# Patient Record
Sex: MALE | Race: WHITE | Employment: UNEMPLOYED | URBAN - METROPOLITAN AREA
[De-identification: names, ages, dates, MRNs, and addresses within clinical notes are randomized per-mention and may not be internally consistent; named-entity substitution may affect disease eponyms.]

---

## 2019-02-06 ENCOUNTER — OFFICE VISIT (OUTPATIENT)
Dept: FAMILY MEDICINE CLINIC | Age: 19
End: 2019-02-06
Payer: MEDICARE

## 2019-02-06 VITALS
WEIGHT: 122 LBS | HEART RATE: 76 BPM | SYSTOLIC BLOOD PRESSURE: 116 MMHG | BODY MASS INDEX: 18.49 KG/M2 | HEIGHT: 68 IN | RESPIRATION RATE: 17 BRPM | TEMPERATURE: 97.3 F | DIASTOLIC BLOOD PRESSURE: 78 MMHG

## 2019-02-06 DIAGNOSIS — M95.4 CHEST WALL DEFORMITY: ICD-10-CM

## 2019-02-06 DIAGNOSIS — L70.0 ACNE VULGARIS: ICD-10-CM

## 2019-02-06 DIAGNOSIS — Z76.89 ENCOUNTER TO ESTABLISH CARE: Primary | ICD-10-CM

## 2019-02-06 DIAGNOSIS — J30.2 SEASONAL ALLERGIC RHINITIS, UNSPECIFIED TRIGGER: ICD-10-CM

## 2019-02-06 PROCEDURE — G8420 CALC BMI NORM PARAMETERS: HCPCS | Performed by: NURSE PRACTITIONER

## 2019-02-06 PROCEDURE — 1036F TOBACCO NON-USER: CPT | Performed by: NURSE PRACTITIONER

## 2019-02-06 PROCEDURE — 99204 OFFICE O/P NEW MOD 45 MIN: CPT | Performed by: NURSE PRACTITIONER

## 2019-02-06 PROCEDURE — G8484 FLU IMMUNIZE NO ADMIN: HCPCS | Performed by: NURSE PRACTITIONER

## 2019-02-06 PROCEDURE — G8427 DOCREV CUR MEDS BY ELIG CLIN: HCPCS | Performed by: NURSE PRACTITIONER

## 2019-02-06 RX ORDER — SULFAMETHOXAZOLE AND TRIMETHOPRIM 800; 160 MG/1; MG/1
1 TABLET ORAL 2 TIMES DAILY
COMMUNITY
End: 2019-02-06 | Stop reason: SDUPTHER

## 2019-02-06 RX ORDER — SULFAMETHOXAZOLE AND TRIMETHOPRIM 800; 160 MG/1; MG/1
1 TABLET ORAL 2 TIMES DAILY
Qty: 60 TABLET | Refills: 2 | Status: SHIPPED | OUTPATIENT
Start: 2019-02-06 | End: 2019-06-13 | Stop reason: ALTCHOICE

## 2019-02-06 RX ORDER — CETIRIZINE HYDROCHLORIDE 10 MG/1
1 TABLET ORAL DAILY
COMMUNITY
Start: 2018-12-09 | End: 2020-02-06

## 2019-02-06 ASSESSMENT — PATIENT HEALTH QUESTIONNAIRE - PHQ9
SUM OF ALL RESPONSES TO PHQ QUESTIONS 1-9: 0
2. FEELING DOWN, DEPRESSED OR HOPELESS: 0
SUM OF ALL RESPONSES TO PHQ QUESTIONS 1-9: 0
1. LITTLE INTEREST OR PLEASURE IN DOING THINGS: 0
SUM OF ALL RESPONSES TO PHQ9 QUESTIONS 1 & 2: 0

## 2019-02-06 ASSESSMENT — ENCOUNTER SYMPTOMS
EYE PAIN: 0
ABDOMINAL PAIN: 0
RHINORRHEA: 0
SORE THROAT: 0
VOMITING: 0
TROUBLE SWALLOWING: 0
EYE DISCHARGE: 0
WHEEZING: 0
CONSTIPATION: 0
BACK PAIN: 0
DIARRHEA: 0
NAUSEA: 0
COUGH: 0
SHORTNESS OF BREATH: 0

## 2019-06-13 ENCOUNTER — OFFICE VISIT (OUTPATIENT)
Dept: FAMILY MEDICINE CLINIC | Age: 19
End: 2019-06-13
Payer: MEDICARE

## 2019-06-13 VITALS
HEART RATE: 88 BPM | RESPIRATION RATE: 16 BRPM | TEMPERATURE: 97.6 F | SYSTOLIC BLOOD PRESSURE: 130 MMHG | WEIGHT: 130 LBS | BODY MASS INDEX: 19.77 KG/M2 | DIASTOLIC BLOOD PRESSURE: 78 MMHG

## 2019-06-13 DIAGNOSIS — R45.89 ANXIOUS APPEARANCE: Primary | ICD-10-CM

## 2019-06-13 PROBLEM — Q67.6 PECTUS EXCAVATUM: Status: ACTIVE | Noted: 2017-11-09

## 2019-06-13 PROCEDURE — G8420 CALC BMI NORM PARAMETERS: HCPCS | Performed by: NURSE PRACTITIONER

## 2019-06-13 PROCEDURE — 99212 OFFICE O/P EST SF 10 MIN: CPT | Performed by: NURSE PRACTITIONER

## 2019-06-13 PROCEDURE — 1036F TOBACCO NON-USER: CPT | Performed by: NURSE PRACTITIONER

## 2019-06-13 PROCEDURE — G8427 DOCREV CUR MEDS BY ELIG CLIN: HCPCS | Performed by: NURSE PRACTITIONER

## 2019-06-13 ASSESSMENT — ENCOUNTER SYMPTOMS
EYE REDNESS: 0
BLOOD IN STOOL: 0
EYE PAIN: 0
VOMITING: 0
CHEST TIGHTNESS: 0
BACK PAIN: 0
SORE THROAT: 0
SINUS PRESSURE: 0
ABDOMINAL DISTENTION: 0
ABDOMINAL PAIN: 0
WHEEZING: 0
SHORTNESS OF BREATH: 0
COUGH: 0
EYE DISCHARGE: 0
DIARRHEA: 0
RHINORRHEA: 0
NAUSEA: 0

## 2019-06-13 NOTE — PROGRESS NOTES
Subjective:      Patient ID: Brianda Breaux is a 19 y.o. male. Visit Information    Have you changed or started any medications since your last visit including any over-the-counter medicines, vitamins, or herbal medicines? no   Are you having any side effects from any of your medications? -  no  Have you stopped taking any of your medications? Is so, why? -  no    Have you seen any other physician or provider since your last visit? No  Have you had any other diagnostic tests since your last visit? No  Have you been seen in the emergency room and/or had an admission to a hospital since we last saw you? No  Have you had your routine dental cleaning in the past 6 months? no    Have you activated your [employer] account? If not, what are your barriers? Yes     Patient Care Team:  RON Apple CNP as PCP - General (Certified Nurse Practitioner)  RON Apple CNP as PCP - [facility]    Medical History Review  Past Medical, Family, and Social History reviewed and does contribute to the patient presenting condition    Health Maintenance   Topic Date Due    Varicella Vaccine (2 of 2 - 2-dose childhood series) 09/05/2002    HIV screen  09/05/2013    Flu vaccine (Season Ended) 02/01/2020 (Originally 9/1/2019)    DTaP/Tdap/Td vaccine (8 - Td) 09/12/2026    HPV vaccine  Completed    Meningococcal (ACWY) Vaccine  Completed    Pneumococcal 0-64 years Vaccine  Aged Out       [facility] Scores 2/6/2019   PHQ2 Score 0   PHQ9 Score 0     Interpretation of Total Score DepressionSeverity: 1-4 = Minimal depression, 5-9 = Mild depression, 10-14 = Moderate depression, 15-19 = Moderately severe depression, 20-27 = Severe depression    Current Outpatient Medications   Medication Sig Dispense Refill    cetirizine (ZYRTEC) 10 MG tablet Take 1 tablet by mouth daily       No current facility-administered medications for this visit.         HPI  Patient presents today with concerns regarding testicles for last week. He noticed that one was hanging lower than the other when using bathroom which had not noticed before. Review of Systems   Constitutional: Negative for activity change, appetite change, fatigue and fever. HENT: Negative for congestion, ear discharge, ear pain, postnasal drip, rhinorrhea, sinus pressure and sore throat. Eyes: Negative for pain, discharge, redness and visual disturbance. Respiratory: Negative for cough, chest tightness, shortness of breath and wheezing. Cardiovascular: Negative for chest pain, palpitations and leg swelling. Gastrointestinal: Negative for abdominal distention, abdominal pain, blood in stool, diarrhea, nausea and vomiting. Endocrine: Negative for polydipsia, polyphagia and polyuria. Genitourinary: Negative for decreased urine volume, difficulty urinating, discharge, dysuria, flank pain, frequency, genital sores, hematuria and urgency. Musculoskeletal: Negative for arthralgias, back pain, joint swelling, myalgias and neck stiffness. Skin: Negative for rash. Neurological: Negative for dizziness, syncope, weakness, light-headedness and headaches. Hematological: Negative for adenopathy. Psychiatric/Behavioral: Negative for agitation, decreased concentration, self-injury, sleep disturbance and suicidal ideas. The patient is nervous/anxious. The patient is not hyperactive. Objective:     /78 (Site: Right Upper Arm)   Pulse 88   Temp 97.6 °F (36.4 °C) (Tympanic)   Resp 16   Wt 130 lb (59 kg)   BMI 19.77 kg/m²      Physical Exam   Constitutional: He is oriented to person, place, and time. Vital signs are normal. He appears well-developed and well-nourished. He is cooperative. No distress. HENT:   Head: Atraumatic.    Right Ear: Tympanic membrane, external ear and ear canal normal.   Left Ear: Tympanic membrane, external ear and ear canal normal.   Nose: Nose normal.   Mouth/Throat: Uvula is midline, oropharynx is clear and moist and mucous Return if symptoms worsen or fail to improve. Golden Tapia received counseling on the following healthy behaviors: nutrition and medication adherence  Reviewed prior labs and health maintenance. Continue current medications, diet and exercise. Discussed use, benefit, and side effects of prescribed medications. Barriers to medication compliance addressed. Patient given educational materials - see patient instructions. All patient questions answered. Patient voiced understanding.            Electronically signed by RON Tang CNP on 6/13/2019 at 4:03 PM

## 2021-02-24 ENCOUNTER — TELEPHONE (OUTPATIENT)
Dept: FAMILY MEDICINE CLINIC | Age: 21
End: 2021-02-24

## 2021-02-24 NOTE — TELEPHONE ENCOUNTER
Patient went to [facility] in [place] on [address]. He was having issues with his urine and had a Urine Culture for STD/s as well. Wants to make sure everything came back ok. Requested reports from [facility] already. Patient was also scheduled for 03/01/2021 with . Will Scan in the system as soon as results are received from [address] [place] [address].

## 2021-03-01 ENCOUNTER — PATIENT MESSAGE (OUTPATIENT)
Dept: FAMILY MEDICINE CLINIC | Age: 21
End: 2021-03-01

## 2021-03-01 ENCOUNTER — HOSPITAL ENCOUNTER (OUTPATIENT)
Age: 21
Setting detail: SPECIMEN
Discharge: HOME | End: 2021-03-01
Payer: MEDICARE

## 2021-03-01 ENCOUNTER — OFFICE VISIT (OUTPATIENT)
Dept: FAMILY MEDICINE CLINIC | Age: 21
End: 2021-03-01
Payer: MEDICARE

## 2021-03-01 VITALS
DIASTOLIC BLOOD PRESSURE: 72 MMHG | TEMPERATURE: 98.7 F | BODY MASS INDEX: 19.1 KG/M2 | OXYGEN SATURATION: 99 % | HEART RATE: 64 BPM | WEIGHT: 126 LBS | HEIGHT: 68 IN | SYSTOLIC BLOOD PRESSURE: 128 MMHG
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
## 2021-03-01 DIAGNOSIS — R10.31 BILATERAL GROIN PAIN: ICD-10-CM

## 2021-03-01 DIAGNOSIS — L70.0 ACNE VULGARIS: Primary | ICD-10-CM

## 2021-03-01 DIAGNOSIS — R10.32 BILATERAL GROIN PAIN: ICD-10-CM

## 2021-03-01 DIAGNOSIS — L70.0 ACNE VULGARIS: ICD-10-CM

## 2021-03-01 LAB
ABSOLUTE EOS #: 0.08 K/UL (ref 0–0.44)
ABSOLUTE IMMATURE GRANULOCYTE: <0.03 K/UL (ref 0–0.3)
ABSOLUTE LYMPH #: 1.77 K/UL (ref 1.1–3.7)
ABSOLUTE MONO #: 0.6 K/UL (ref 0.1–1.2)
ALBUMIN SERPL-MCNC: 4.8 G/DL (ref 3.5–5.2)
ALBUMIN/GLOBULIN RATIO: 1.8 (ref 1–2.5)
ALP BLD-CCNC: 48 U/L (ref 40–129)
ALT SERPL-CCNC: 11 U/L (ref 5–41)
ANION GAP SERPL CALCULATED.3IONS-SCNC: 6 MMOL/L (ref 9–17)
AST SERPL-CCNC: 17 U/L
BASOPHILS # BLD: 1 % (ref 0–2)
BASOPHILS ABSOLUTE: 0.03 K/UL (ref 0–0.2)
BILIRUB SERPL-MCNC: 0.4 MG/DL (ref 0.3–1.2)
BUN BLDV-MCNC: 15 MG/DL (ref 6–20)
BUN/CREAT BLD: ABNORMAL (ref 9–20)
CALCIUM SERPL-MCNC: 9.9 MG/DL (ref 8.6–10.4)
CHLORIDE BLD-SCNC: 99 MMOL/L (ref 98–107)
CO2: 27 MMOL/L (ref 20–31)
CREAT SERPL-MCNC: 0.74 MG/DL (ref 0.7–1.2)
DIFFERENTIAL TYPE: NORMAL
EOSINOPHILS RELATIVE PERCENT: 1 % (ref 1–4)
GFR AFRICAN AMERICAN: >60 ML/MIN
GFR NON-AFRICAN AMERICAN: >60 ML/MIN
GFR SERPL CREATININE-BSD FRML MDRD: ABNORMAL ML/MIN/{1.73_M2}
GFR SERPL CREATININE-BSD FRML MDRD: ABNORMAL ML/MIN/{1.73_M2}
GLUCOSE FASTING: 87 MG/DL (ref 70–99)
HCT VFR BLD CALC: 48.2 % (ref 40.7–50.3)
HEMOGLOBIN: 16.2 G/DL (ref 13–17)
IMMATURE GRANULOCYTES: 0 %
LYMPHOCYTES # BLD: 31 % (ref 24–43)
MCH RBC QN AUTO: 30.5 PG (ref 25.2–33.5)
MCHC RBC AUTO-ENTMCNC: 33.6 G/DL (ref 28.4–34.8)
MCV RBC AUTO: 90.8 FL (ref 82.6–102.9)
MONOCYTES # BLD: 11 % (ref 3–12)
NRBC AUTOMATED: 0 PER 100 WBC
PDW BLD-RTO: 13.2 % (ref 11.8–14.4)
PLATELET # BLD: 278 K/UL (ref 138–453)
PLATELET ESTIMATE: NORMAL
PMV BLD AUTO: 11.7 FL (ref 8.1–13.5)
POTASSIUM SERPL-SCNC: 4.7 MMOL/L (ref 3.7–5.3)
RBC # BLD: 5.31 M/UL (ref 4.21–5.77)
RBC # BLD: NORMAL 10*6/UL
SEG NEUTROPHILS: 56 % (ref 36–65)
SEGMENTED NEUTROPHILS ABSOLUTE COUNT: 3.19 K/UL (ref 1.5–8.1)
SODIUM BLD-SCNC: 132 MMOL/L (ref 135–144)
TOTAL PROTEIN: 7.5 G/DL (ref 6.4–8.3)
WBC # BLD: 5.7 K/UL (ref 3.5–11.3)
WBC # BLD: NORMAL 10*3/UL

## 2021-03-01 PROCEDURE — G8420 CALC BMI NORM PARAMETERS: HCPCS | Performed by: STUDENT IN AN ORGANIZED HEALTH CARE EDUCATION/TRAINING PROGRAM

## 2021-03-01 PROCEDURE — 1036F TOBACCO NON-USER: CPT | Performed by: STUDENT IN AN ORGANIZED HEALTH CARE EDUCATION/TRAINING PROGRAM

## 2021-03-01 PROCEDURE — G8484 FLU IMMUNIZE NO ADMIN: HCPCS | Performed by: STUDENT IN AN ORGANIZED HEALTH CARE EDUCATION/TRAINING PROGRAM

## 2021-03-01 PROCEDURE — 99213 OFFICE O/P EST LOW 20 MIN: CPT | Performed by: STUDENT IN AN ORGANIZED HEALTH CARE EDUCATION/TRAINING PROGRAM

## 2021-03-01 PROCEDURE — G8427 DOCREV CUR MEDS BY ELIG CLIN: HCPCS | Performed by: STUDENT IN AN ORGANIZED HEALTH CARE EDUCATION/TRAINING PROGRAM

## 2021-03-01 RX ORDER — ADAPALENE 45 G/G
GEL TOPICAL
Qty: 1 TUBE | Refills: 4 | Status: SHIPPED | OUTPATIENT
Start: 2021-03-01 | End: 2021-03-31

## 2021-03-01 RX ORDER — SPIRONOLACTONE 25 MG/1
12.5 TABLET ORAL DAILY
Qty: 45 TABLET | Refills: 1 | Status: SHIPPED | OUTPATIENT
Start: 2021-03-01 | End: 2022-05-11 | Stop reason: ALTCHOICE

## 2021-03-01 RX ORDER — SULFAMETHOXAZOLE AND TRIMETHOPRIM 400; 80 MG/1; MG/1
1 TABLET ORAL 2 TIMES DAILY
COMMUNITY
End: 2021-03-01

## 2021-03-01 RX ORDER — CETIRIZINE HYDROCHLORIDE 10 MG/1
10 TABLET ORAL DAILY
COMMUNITY

## 2021-03-01 SDOH — ECONOMIC STABILITY: INCOME INSECURITY: HOW HARD IS IT FOR YOU TO PAY FOR THE VERY BASICS LIKE FOOD, HOUSING, MEDICAL CARE, AND HEATING?: NOT HARD AT ALL

## 2021-03-01 SDOH — ECONOMIC STABILITY: FOOD INSECURITY: WITHIN THE PAST 12 MONTHS, THE FOOD YOU BOUGHT JUST DIDN'T LAST AND YOU DIDN'T HAVE MONEY TO GET MORE.: NEVER TRUE

## 2021-03-01 ASSESSMENT — PATIENT HEALTH QUESTIONNAIRE - PHQ9
2. FEELING DOWN, DEPRESSED OR HOPELESS: 0
SUM OF ALL RESPONSES TO PHQ QUESTIONS 1-9: 0
SUM OF ALL RESPONSES TO PHQ QUESTIONS 1-9: 0
SUM OF ALL RESPONSES TO PHQ9 QUESTIONS 1 & 2: 0

## 2021-03-01 NOTE — TELEPHONE ENCOUNTER
From: Elsa Lozano  To: Ryne Moy MD  Sent: 3/1/2021 12:55 PM EST  Subject: Prescription Question    Hey I seen you today may you send my refills in for my prescription today please and thank you much appreciate it

## 2021-03-01 NOTE — PROGRESS NOTES
Rick Blas (:  2000) is a 23 y.o. male,Established patient, here for evaluation of the following chief complaint(s):  ED Follow-up (groin pain)      ASSESSMENT/PLAN:  1. Acne vulgaris  -     CBC With Auto Differential; Future  -     Comprehensive Metabolic Panel, Fasting; Future  2. Bilateral groin pain  -     CBC With Auto Differential; Future  -     Comprehensive Metabolic Panel, Fasting; Future  3. Urgent care center as place of occurrence of external cause  -     CBC With Auto Differential; Future  -     Comprehensive Metabolic Panel, Fasting; Future    Patient was seen in the urgent care  for centralized groin pain/abdominal pain? He has a hard time describing the symptoms, but this may be related to some matization, organic GI pathology i.e. GERD gastritis dyspepsia, or early stages of appendicitis far less likely and inguinal hernia    At this point, patient is totally asymptomatic is here to request some lab work which I think is reasonable  He has instructions to update his my chart and contact me the next time that he experiences the symptoms that we can proceed with further testing examination of the abdomen and groin completely benign    Since he was tested at the urgent care for gonorrhea chlamydia and a urinalysis I do not think any further testing is warranted. As needed    SUBJECTIVE/OBJECTIVE:  HPI: 68-year-old male with history of acne vulgaris pectus excavatum and allergic rhinitis presents after being seen in the urgent care on  for centralized groin pain    He had a full STD work-up then and was checked for Neisseria gonorrhea chlamydia and had a urinalysis performed which did rule out a UTI    He also had a urine culture in the setting of a bland urinalysis which I am unsure of the utility.     Irrespective he was started on Bactrim not double strength has completed that course was told that it might hurt his liver so he is here to make sure that his liver chemistries are totally normal    No other complaints at this time    Review of Systems 12 point ROS per HPI    Physical Exam alert and oriented to person place time normocephalic atraumatic extraocular muscles are intact  Neck supple trachea midline  Wears corrective lenses  Lungs clear to auscultation bilaterally no rhonchi wheezes or rales  RRR S1-S2 normal physiologic splitting  Abdomen is soft nontender nondistended no acute abdomen roving signs negative obturator sign negative psoas sign negative  No groin pain along the inguinal canals  Skin is warm and dry there is some acne vulgaris on the face  Thin extremities no ankle edema  Normal affect and mood        An electronic signature was used to authenticate this note.     --Rosario Sands MD

## 2021-03-02 DIAGNOSIS — L70.0 CYSTIC ACNE: Primary | ICD-10-CM

## 2021-03-02 RX ORDER — SULFAMETHOXAZOLE AND TRIMETHOPRIM 400; 80 MG/1; MG/1
1 TABLET ORAL DAILY
Qty: 90 TABLET | Refills: 1 | Status: SHIPPED | OUTPATIENT
Start: 2021-03-02 | End: 2021-05-31

## 2022-04-13 ENCOUNTER — PATIENT MESSAGE (OUTPATIENT)
Dept: FAMILY MEDICINE CLINIC | Age: 22
End: 2022-04-13

## 2022-04-13 NOTE — TELEPHONE ENCOUNTER
From: Zaheer Cedeno  To: Dr. Dawood Strickland  Sent: 4/13/2022 9:59 AM EDT  Subject: Prescription    Sulfamethoxazole I need refills for my Acne medication may you be able to do that please and thank you very much

## 2022-04-14 RX ORDER — SULFAMETHOXAZOLE AND TRIMETHOPRIM 400; 80 MG/1; MG/1
1 TABLET ORAL 2 TIMES DAILY
Qty: 30 TABLET | Refills: 0 | Status: SHIPPED | OUTPATIENT
Start: 2022-04-14 | End: 2022-05-18 | Stop reason: SDUPTHER

## 2022-05-02 ENCOUNTER — TELEPHONE (OUTPATIENT)
Dept: FAMILY MEDICINE CLINIC | Age: 22
End: 2022-05-02

## 2022-05-02 NOTE — TELEPHONE ENCOUNTER
Patient called into the office asking if he still needs to take the omeprazole? States that he has been taking for a couple weeks now and feels better.      Has an appt scheduled on the 16th with you but wanted to know about this prior to the 16th

## 2022-05-11 RX ORDER — OMEPRAZOLE 40 MG/1
CAPSULE, DELAYED RELEASE ORAL
COMMUNITY
Start: 2022-04-29

## 2022-05-11 RX ORDER — LORATADINE 10 MG/1
10 TABLET ORAL DAILY
COMMUNITY

## 2022-05-16 ENCOUNTER — OFFICE VISIT (OUTPATIENT)
Dept: FAMILY MEDICINE CLINIC | Age: 22
End: 2022-05-16
Payer: MEDICARE

## 2022-05-16 ENCOUNTER — HOSPITAL ENCOUNTER (OUTPATIENT)
Age: 22
Setting detail: SPECIMEN
Discharge: HOME | End: 2022-05-16

## 2022-05-16 VITALS
DIASTOLIC BLOOD PRESSURE: 80 MMHG | BODY MASS INDEX: 18.51 KG/M2 | HEIGHT: 69 IN | SYSTOLIC BLOOD PRESSURE: 120 MMHG | WEIGHT: 125 LBS

## 2022-05-16 DIAGNOSIS — Z11.59 NEED FOR HEPATITIS C SCREENING TEST: Primary | ICD-10-CM

## 2022-05-16 DIAGNOSIS — Z11.59 NEED FOR HEPATITIS C SCREENING TEST: ICD-10-CM

## 2022-05-16 DIAGNOSIS — Q67.6 PECTUS EXCAVATUM: ICD-10-CM

## 2022-05-16 DIAGNOSIS — L70.0 ACNE VULGARIS: ICD-10-CM

## 2022-05-16 DIAGNOSIS — Z11.4 SCREENING FOR HIV (HUMAN IMMUNODEFICIENCY VIRUS): ICD-10-CM

## 2022-05-16 LAB
ALBUMIN SERPL-MCNC: 5 G/DL (ref 3.5–5.2)
ALBUMIN/GLOBULIN RATIO: 2.1 (ref 1–2.5)
ALP BLD-CCNC: 47 U/L (ref 40–129)
ALT SERPL-CCNC: 14 U/L (ref 5–41)
ANION GAP SERPL CALCULATED.3IONS-SCNC: 9 MMOL/L (ref 9–17)
AST SERPL-CCNC: 20 U/L
BILIRUB SERPL-MCNC: 0.43 MG/DL (ref 0.3–1.2)
BUN BLDV-MCNC: 12 MG/DL (ref 6–20)
CALCIUM SERPL-MCNC: 10 MG/DL (ref 8.6–10.4)
CHLORIDE BLD-SCNC: 103 MMOL/L (ref 98–107)
CO2: 28 MMOL/L (ref 20–31)
CREAT SERPL-MCNC: 0.7 MG/DL (ref 0.7–1.2)
GFR AFRICAN AMERICAN: >60 ML/MIN
GFR NON-AFRICAN AMERICAN: >60 ML/MIN
GFR SERPL CREATININE-BSD FRML MDRD: NORMAL ML/MIN/{1.73_M2}
GLUCOSE BLD-MCNC: 78 MG/DL (ref 70–99)
HCT VFR BLD CALC: 47.2 % (ref 40.7–50.3)
HEMOGLOBIN: 15.9 G/DL (ref 13–17)
HEPATITIS C ANTIBODY: NONREACTIVE
HIV AG/AB: NONREACTIVE
MCH RBC QN AUTO: 31 PG (ref 25.2–33.5)
MCHC RBC AUTO-ENTMCNC: 33.7 G/DL (ref 28.4–34.8)
MCV RBC AUTO: 92 FL (ref 82.6–102.9)
NRBC AUTOMATED: 0 PER 100 WBC
PDW BLD-RTO: 12.9 % (ref 11.8–14.4)
PLATELET # BLD: 254 K/UL (ref 138–453)
PMV BLD AUTO: 11.6 FL (ref 8.1–13.5)
POTASSIUM SERPL-SCNC: 4.9 MMOL/L (ref 3.7–5.3)
RBC # BLD: 5.13 M/UL (ref 4.21–5.77)
SODIUM BLD-SCNC: 140 MMOL/L (ref 135–144)
TOTAL PROTEIN: 7.4 G/DL (ref 6.4–8.3)
WBC # BLD: 5.8 K/UL (ref 3.5–11.3)

## 2022-05-16 PROCEDURE — G8427 DOCREV CUR MEDS BY ELIG CLIN: HCPCS | Performed by: STUDENT IN AN ORGANIZED HEALTH CARE EDUCATION/TRAINING PROGRAM

## 2022-05-16 PROCEDURE — G8419 CALC BMI OUT NRM PARAM NOF/U: HCPCS | Performed by: STUDENT IN AN ORGANIZED HEALTH CARE EDUCATION/TRAINING PROGRAM

## 2022-05-16 PROCEDURE — 1036F TOBACCO NON-USER: CPT | Performed by: STUDENT IN AN ORGANIZED HEALTH CARE EDUCATION/TRAINING PROGRAM

## 2022-05-16 PROCEDURE — 99213 OFFICE O/P EST LOW 20 MIN: CPT | Performed by: STUDENT IN AN ORGANIZED HEALTH CARE EDUCATION/TRAINING PROGRAM

## 2022-05-16 SDOH — ECONOMIC STABILITY: FOOD INSECURITY: WITHIN THE PAST 12 MONTHS, YOU WORRIED THAT YOUR FOOD WOULD RUN OUT BEFORE YOU GOT MONEY TO BUY MORE.: NEVER TRUE

## 2022-05-16 SDOH — ECONOMIC STABILITY: FOOD INSECURITY: WITHIN THE PAST 12 MONTHS, THE FOOD YOU BOUGHT JUST DIDN'T LAST AND YOU DIDN'T HAVE MONEY TO GET MORE.: NEVER TRUE

## 2022-05-16 ASSESSMENT — PATIENT HEALTH QUESTIONNAIRE - PHQ9
1. LITTLE INTEREST OR PLEASURE IN DOING THINGS: 0
SUM OF ALL RESPONSES TO PHQ QUESTIONS 1-9: 0
SUM OF ALL RESPONSES TO PHQ9 QUESTIONS 1 & 2: 0
2. FEELING DOWN, DEPRESSED OR HOPELESS: 0

## 2022-05-16 ASSESSMENT — SOCIAL DETERMINANTS OF HEALTH (SDOH): HOW HARD IS IT FOR YOU TO PAY FOR THE VERY BASICS LIKE FOOD, HOUSING, MEDICAL CARE, AND HEATING?: NOT HARD AT ALL

## 2022-05-16 NOTE — PROGRESS NOTES
Helio Zamora (:  2000) is a 19 y.o. male,Established patient, here for evaluation of the following chief complaint(s):  Chest Pain (lt side rib pain after lifting weights saturday) and Medication Management         ASSESSMENT/PLAN:  1. Need for hepatitis C screening test  -     Hepatitis C Antibody; Future  2. Screening for HIV (human immunodeficiency virus)  -     HIV Screen; Future  3. Pectus excavatum  -     CT CHEST WO CONTRAST; Future  4. Acne vulgaris  -     CBC; Future  -     Comprehensive Metabolic Panel; Future    His dermatologist requested that he come here to get lab work  Not sure why his dermatologist cant order his own labwork, but that's ok. No follow-ups on file. Subjective   SUBJECTIVE/OBJECTIVE:  HPI: 19 M with known pectus excavatum     Has chest pain    Bruising around costochondral 6-7 rib    Pain with arm elevation    Point tenderness around 6-7 rib    Review of Systems Pertinent positives and negatives included in HPI 14 point ROS otherwise negative         Objective   Physical Exam  Vitals and nursing note reviewed. Constitutional:       Appearance: Normal appearance. HENT:      Head: Normocephalic and atraumatic. Eyes:      Extraocular Movements: Extraocular movements intact. Cardiovascular:      Rate and Rhythm: Normal rate and regular rhythm. Pulses: Normal pulses. Heart sounds: Normal heart sounds. Pulmonary:      Effort: Pulmonary effort is normal.      Breath sounds: Normal breath sounds. Abdominal:      General: Abdomen is flat. Palpations: Abdomen is soft. Musculoskeletal:         General: Normal range of motion. Cervical back: Normal range of motion and neck supple. Skin:     General: Skin is warm. Neurological:      General: No focal deficit present. Mental Status: He is alert and oriented to person, place, and time. An electronic signature was used to authenticate this note.     --Lisa Diaz Jairo Paul MD

## 2022-05-18 NOTE — PROGRESS NOTES
Last visit: 5/16/22  Last Med refill: 4/14/22  Does patient have enough medication for 72 hours: Yes    Next Visit Date:  No future appointments.     Health Maintenance   Topic Date Due    Varicella vaccine (2 of 2 - 2-dose childhood series) 05/15/2023 (Originally 9/5/2002)    COVID-19 Vaccine (1) 05/22/2023 (Originally 9/5/2003)    Flu vaccine (Season Ended) 09/01/2022    Depression Screen  05/16/2023    DTaP/Tdap/Td vaccine (5 - Td or Tdap) 09/12/2026    Hepatitis A vaccine  Completed    Hepatitis B vaccine  Completed    Hib vaccine  Completed    HPV vaccine  Completed    Meningococcal (ACWY) vaccine  Completed    Hepatitis C screen  Completed    HIV screen  Completed    Pneumococcal 0-64 years Vaccine  Aged Out       No results found for: LABA1C          ( goal A1C is < 7)   No results found for: LABMICR  No results found for: LDLCHOLESTEROL, LDLCALC    (goal LDL is <100)   AST (U/L)   Date Value   05/16/2022 20     ALT (U/L)   Date Value   05/16/2022 14     BUN (mg/dL)   Date Value   05/16/2022 12     BP Readings from Last 3 Encounters:   05/16/22 120/80   03/01/21 128/72   06/13/19 130/78          (goal 120/80)    All Future Testing planned in [facility]  Lab Frequency Next Occurrence   CT CHEST WO CONTRAST Once 05/16/2022               Patient Active Problem List:     Acne vulgaris     Seasonal allergic rhinitis     Chest wall deformity     Pectus excavatum

## 2022-05-19 RX ORDER — SULFAMETHOXAZOLE AND TRIMETHOPRIM 400; 80 MG/1; MG/1
1 TABLET ORAL 2 TIMES DAILY
Qty: 30 TABLET | Refills: 3 | Status: SHIPPED | OUTPATIENT
Start: 2022-05-19 | End: 2022-06-18

## 2023-03-09 ENCOUNTER — TELEPHONE (OUTPATIENT)
Dept: FAMILY MEDICINE CLINIC | Age: 23
End: 2023-03-09

## 2023-03-09 RX ORDER — ONDANSETRON 4 MG/1
TABLET, ORALLY DISINTEGRATING ORAL
COMMUNITY
Start: 2023-03-05

## 2023-03-09 RX ORDER — FAMOTIDINE 20 MG/1
TABLET, FILM COATED ORAL
COMMUNITY
Start: 2023-03-05

## 2023-03-09 RX ORDER — SULFAMETHOXAZOLE AND TRIMETHOPRIM 400; 80 MG/1; MG/1
TABLET ORAL
COMMUNITY
Start: 2023-02-28

## 2023-03-09 NOTE — TELEPHONE ENCOUNTER
ED Follow up Call    Reason for ED visit:  Left sided pain   Status:     not changed    Did you call your PCP prior to going to the ED? No      Did you receive a discharge instructions from the Emergency Room? Yes  Review of Instructions:     Understands what to report/when to return?:  Yes   Understands discharge instructions?:  Yes   Following discharge instructions?:  Yes   If not why? Are there any new complaints of pain? No  New Pain Meds? No    Constipation prophylaxis needed? N/A    If you have a wound is the dressing clean, dry, and intact? N/A  Understands wound care regimen? N/A    Are there any other complaints/concerns that you wish to tell your provider? FU appts/Provider:    No future appointments. New Medications?:   Yes      Medication Reconciliation by phone - Yes  Understands Medications? Yes  Taking Medications? Yes  Can you swallow your pills? Yes    Any further needs in the home i.e. Equipment?   Not Applicable    Link to services in community?:  N/A   Which services:

## 2023-03-14 ENCOUNTER — HOSPITAL ENCOUNTER (OUTPATIENT)
Age: 23
Setting detail: SPECIMEN
Discharge: HOME | End: 2023-03-14

## 2023-03-14 ENCOUNTER — OFFICE VISIT (OUTPATIENT)
Dept: FAMILY MEDICINE CLINIC | Age: 23
End: 2023-03-14
Payer: MEDICAID

## 2023-03-14 VITALS
HEIGHT: 68 IN | OXYGEN SATURATION: 99 % | BODY MASS INDEX: 19.1 KG/M2 | WEIGHT: 126 LBS | SYSTOLIC BLOOD PRESSURE: 120 MMHG | DIASTOLIC BLOOD PRESSURE: 88 MMHG | HEART RATE: 78 BPM

## 2023-03-14 DIAGNOSIS — R53.83 OTHER FATIGUE: Primary | ICD-10-CM

## 2023-03-14 DIAGNOSIS — R42 DIZZINESS: ICD-10-CM

## 2023-03-14 DIAGNOSIS — R42 VERTIGO: ICD-10-CM

## 2023-03-14 DIAGNOSIS — R53.83 OTHER FATIGUE: ICD-10-CM

## 2023-03-14 DIAGNOSIS — M54.9 ACUTE LEFT-SIDED BACK PAIN, UNSPECIFIED BACK LOCATION: ICD-10-CM

## 2023-03-14 LAB
ALBUMIN SERPL-MCNC: 4.7 G/DL (ref 3.5–5.2)
ALBUMIN/GLOBULIN RATIO: 1.6 (ref 1–2.5)
ALP SERPL-CCNC: 47 U/L (ref 40–129)
ALT SERPL-CCNC: 15 U/L (ref 5–41)
ANION GAP SERPL CALCULATED.3IONS-SCNC: 11 MMOL/L (ref 9–17)
AST SERPL-CCNC: 20 U/L
BILIRUB SERPL-MCNC: 0.3 MG/DL (ref 0.3–1.2)
BUN SERPL-MCNC: 13 MG/DL (ref 6–20)
CALCIUM SERPL-MCNC: 10 MG/DL (ref 8.6–10.4)
CHLORIDE SERPL-SCNC: 101 MMOL/L (ref 98–107)
CO2 SERPL-SCNC: 26 MMOL/L (ref 20–31)
CREAT SERPL-MCNC: 0.77 MG/DL (ref 0.7–1.2)
GFR SERPL CREATININE-BSD FRML MDRD: >60 ML/MIN/1.73M2
GLUCOSE SERPL-MCNC: 80 MG/DL (ref 70–99)
HCT VFR BLD AUTO: 48.4 % (ref 40.7–50.3)
HGB BLD-MCNC: 16.1 G/DL (ref 13–17)
MCH RBC QN AUTO: 30 PG (ref 25.2–33.5)
MCHC RBC AUTO-ENTMCNC: 33.3 G/DL (ref 28.4–34.8)
MCV RBC AUTO: 90.1 FL (ref 82.6–102.9)
NRBC AUTOMATED: 0 PER 100 WBC
PDW BLD-RTO: 13.2 % (ref 11.8–14.4)
PLATELET # BLD AUTO: 283 K/UL (ref 138–453)
PMV BLD AUTO: 10.8 FL (ref 8.1–13.5)
POTASSIUM SERPL-SCNC: 5 MMOL/L (ref 3.7–5.3)
PROT SERPL-MCNC: 7.6 G/DL (ref 6.4–8.3)
RBC # BLD: 5.37 M/UL (ref 4.21–5.77)
SODIUM SERPL-SCNC: 138 MMOL/L (ref 135–144)
TSH SERPL-ACNC: 2.37 UIU/ML (ref 0.3–5)
WBC # BLD AUTO: 4.9 K/UL (ref 3.5–11.3)

## 2023-03-14 PROCEDURE — 99214 OFFICE O/P EST MOD 30 MIN: CPT

## 2023-03-14 RX ORDER — MECLIZINE HCL 12.5 MG/1
12.5 TABLET ORAL 3 TIMES DAILY PRN
Qty: 15 TABLET | Refills: 0 | Status: SHIPPED | OUTPATIENT
Start: 2023-03-14 | End: 2023-03-19

## 2023-03-14 SDOH — ECONOMIC STABILITY: HOUSING INSECURITY
IN THE LAST 12 MONTHS, WAS THERE A TIME WHEN YOU DID NOT HAVE A STEADY PLACE TO SLEEP OR SLEPT IN A SHELTER (INCLUDING NOW)?: NO

## 2023-03-14 SDOH — ECONOMIC STABILITY: INCOME INSECURITY: HOW HARD IS IT FOR YOU TO PAY FOR THE VERY BASICS LIKE FOOD, HOUSING, MEDICAL CARE, AND HEATING?: NOT HARD AT ALL

## 2023-03-14 SDOH — ECONOMIC STABILITY: FOOD INSECURITY: WITHIN THE PAST 12 MONTHS, YOU WORRIED THAT YOUR FOOD WOULD RUN OUT BEFORE YOU GOT MONEY TO BUY MORE.: NEVER TRUE

## 2023-03-14 SDOH — ECONOMIC STABILITY: FOOD INSECURITY: WITHIN THE PAST 12 MONTHS, THE FOOD YOU BOUGHT JUST DIDN'T LAST AND YOU DIDN'T HAVE MONEY TO GET MORE.: NEVER TRUE

## 2023-03-14 ASSESSMENT — ENCOUNTER SYMPTOMS
RESPIRATORY NEGATIVE: 1
SORE THROAT: 0
NAUSEA: 0
GASTROINTESTINAL NEGATIVE: 1
VOMITING: 0
COUGH: 0
SHORTNESS OF BREATH: 0
DIARRHEA: 0
EYE DISCHARGE: 0
CONSTIPATION: 0

## 2023-03-14 ASSESSMENT — PATIENT HEALTH QUESTIONNAIRE - PHQ9
1. LITTLE INTEREST OR PLEASURE IN DOING THINGS: 0
SUM OF ALL RESPONSES TO PHQ QUESTIONS 1-9: 0
SUM OF ALL RESPONSES TO PHQ QUESTIONS 1-9: 0
2. FEELING DOWN, DEPRESSED OR HOPELESS: 0
SUM OF ALL RESPONSES TO PHQ QUESTIONS 1-9: 0
SUM OF ALL RESPONSES TO PHQ QUESTIONS 1-9: 0
SUM OF ALL RESPONSES TO PHQ9 QUESTIONS 1 & 2: 0

## 2023-03-14 NOTE — PROGRESS NOTES
William Waters (:  2000) is a 23 y.o. male,Established patient, here for evaluation of the following chief complaint(s):  Follow-Up from Hospital         ASSESSMENT/PLAN:  1. Other fatigue  -     CBC; Future  -     Comprehensive Metabolic Panel; Future  -     TSH with Reflex; Future  2. Dizziness  -     CBC; Future  -     Comprehensive Metabolic Panel; Future  -     meclizine (ANTIVERT) 12.5 MG tablet; Take 1 tablet by mouth 3 times daily as needed for Dizziness, Disp-15 tablet, R-0Normal  3. Acute left-sided back pain, unspecified back location  4. Vertigo  -     meclizine (ANTIVERT) 12.5 MG tablet; Take 1 tablet by mouth 3 times daily as needed for Dizziness, Disp-15 tablet, R-0Normal    Symptoms are consistent with some post viral illness. May have some slight vertigo, and advised that he may use Antivert as needed. Would like him to complete some labs. His left-sided back pain is musculoskeletal in nature on examination. Advised him to use Tylenol or Motrin over-the-counter and rotate for discomfort. Return if symptoms worsen or fail to improve. Subjective   SUBJECTIVE/OBJECTIVE:  Patient here for follow up from ER. He states he was seen for acute illness and \"kidney pain. \" He did not have any urinary symptoms. He has discomfort area over left flank. Had normal chest xray. Did not have any labs when there. He was discharged home with diagnosis of viral illness. Still continuing to have increased left lower flank pain. No SOB. No fevers. Describes the feeling as increased light headedness. No nasal congestion or sore throat. No urinary frequency or pain. Having normal bowel movements. Eating normally. No N/V. States he eats everything he can think of. Patient continues to have some dizziness, when both laying down or walking around. He states that this is sporadic and will last for a little bit and resolve on its own.       Review of Systems   Constitutional:  Negative for activity change, fatigue and fever. HENT:  Negative for dental problem and sore throat. Eyes:  Negative for discharge and visual disturbance. Respiratory: Negative. Negative for cough and shortness of breath. Cardiovascular: Negative. Negative for chest pain, palpitations and leg swelling. Gastrointestinal: Negative. Negative for constipation, diarrhea, nausea and vomiting. Genitourinary:  Negative for difficulty urinating and dysuria. Musculoskeletal:  Positive for myalgias (left side back). Negative for arthralgias. Skin:  Negative for rash and wound. Allergic/Immunologic: Negative for environmental allergies. Neurological:  Positive for dizziness. Negative for weakness and headaches. Hematological: Negative. Does not bruise/bleed easily. Psychiatric/Behavioral: Negative. Negative for agitation and sleep disturbance. Objective   Physical Exam  Vitals reviewed. Constitutional:       General: He is not in acute distress. Appearance: He is not toxic-appearing. Cardiovascular:      Rate and Rhythm: Normal rate and regular rhythm. Heart sounds: Normal heart sounds. No murmur heard. Pulmonary:      Effort: Pulmonary effort is normal. No respiratory distress. Breath sounds: Normal breath sounds. No wheezing. Abdominal:      Tenderness: There is no right CVA tenderness. Musculoskeletal:        Back:       Comments: Pectus excavatum noted - states he has had full work up of this when he was younger with no significant findings. Skin:     General: Skin is warm and dry. Neurological:      Mental Status: He is alert and oriented to person, place, and time. Mental status is at baseline. Psychiatric:         Mood and Affect: Mood normal.         Behavior: Behavior normal.         Thought Content:  Thought content normal.          On this date 3/14/2023 I have spent 25 minutes reviewing previous notes, test results and face to face with the patient discussing the diagnosis and importance of compliance with the treatment plan as well as documenting on the day of the visit. An electronic signature was used to authenticate this note.     --Celena Agent, APRN - CNP